# Patient Record
Sex: FEMALE | Race: WHITE | NOT HISPANIC OR LATINO | URBAN - METROPOLITAN AREA
[De-identification: names, ages, dates, MRNs, and addresses within clinical notes are randomized per-mention and may not be internally consistent; named-entity substitution may affect disease eponyms.]

---

## 2017-03-29 ENCOUNTER — AMBULATORY SURGICAL CENTER (OUTPATIENT)
Dept: URBAN - METROPOLITAN AREA SURGERY 6 | Facility: SURGERY | Age: 62
End: 2017-03-29

## 2017-03-29 VITALS
OXYGEN SATURATION: 95 % | WEIGHT: 182 LBS | DIASTOLIC BLOOD PRESSURE: 74 MMHG | HEART RATE: 70 BPM | HEIGHT: 66 IN | TEMPERATURE: 97.5 F | RESPIRATION RATE: 14 BRPM | SYSTOLIC BLOOD PRESSURE: 127 MMHG

## 2017-03-29 DIAGNOSIS — K64.0 FIRST DEGREE HEMORRHOIDS: ICD-10-CM

## 2017-03-29 DIAGNOSIS — K57.30 DVRTCLOS OF LG INT W/O PERFORATION OR ABSCESS W/O BLEEDING: ICD-10-CM

## 2017-03-29 PROBLEM — Z12.11 SCREENING FOR COLONIC NEOPLASIA: Status: ACTIVE | Noted: 2017-03-29

## 2017-03-29 PROCEDURE — 45378 DIAGNOSTIC COLONOSCOPY: CPT | Performed by: INTERNAL MEDICINE

## 2019-02-22 ENCOUNTER — HOSPITAL ENCOUNTER (EMERGENCY)
Dept: HOSPITAL 54 - ER | Age: 64
Discharge: HOME | End: 2019-02-22
Payer: COMMERCIAL

## 2019-02-22 VITALS — WEIGHT: 187 LBS | BODY MASS INDEX: 31.16 KG/M2 | HEIGHT: 65 IN

## 2019-02-22 VITALS — SYSTOLIC BLOOD PRESSURE: 137 MMHG | DIASTOLIC BLOOD PRESSURE: 74 MMHG

## 2019-02-22 DIAGNOSIS — S06.0X0A: Primary | ICD-10-CM

## 2019-02-22 DIAGNOSIS — E03.9: ICD-10-CM
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
## 2019-02-22 DIAGNOSIS — I10: ICD-10-CM

## 2019-02-22 DIAGNOSIS — R51: ICD-10-CM
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
## 2019-02-22 DIAGNOSIS — S01.01XA: ICD-10-CM
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
## 2019-02-22 PROCEDURE — A6403 STERILE GAUZE>16 <= 48 SQ IN: HCPCS

## 2019-02-22 NOTE — NUR
PT BROUGHT IN BY [employer] FOR GROUND LEVEL FALL WHILE SHOPPING AT [email or web address]

 C/O POSTERIOR HEAD LAC AND R SHOULDER PAIN

## 2024-11-01 ENCOUNTER — OFFICE (OUTPATIENT)
Dept: URBAN - METROPOLITAN AREA CLINIC 13 | Facility: CLINIC | Age: 69
End: 2024-11-01

## 2024-11-01 VITALS
TEMPERATURE: 97.4 F | HEIGHT: 66 IN | SYSTOLIC BLOOD PRESSURE: 118 MMHG | WEIGHT: 185 LBS | HEART RATE: 79 BPM | DIASTOLIC BLOOD PRESSURE: 78 MMHG

## 2024-11-01 DIAGNOSIS — R10.30 ABDOMINAL PAIN, OTHER OR MULTIPLE SITES: ICD-10-CM

## 2024-11-01 DIAGNOSIS — K57.30 DVRTCLOS OF LG INT W/O PERFORATION OR ABSCESS W/O BLEEDING: ICD-10-CM

## 2024-11-01 PROCEDURE — 99203 OFFICE O/P NEW LOW 30 MIN: CPT | Performed by: INTERNAL MEDICINE

## 2024-11-01 NOTE — SERVICEHPINOTES
Patient is a female presenting for initial evaluation of recurrent right upper quadrant abdominal pain persisting for approximately 1.5 years. The pain is described as intermittent, with episodes occurring daily or every other day, and lasting 3-4 hours. The intensity of the pain varies, sometimes being mild and other times severe, described as feeling like a "knife" and necessitating the use of Tylenol for relief. The pain is sometimes accompanied by excessive gas, particularly after consuming certain foods such as onions and radishes. She denies any issues with constipation, diarrhea, hematochezia, or heartburn. She has a history of acid reflux, for which she was treated with a one-month course of medication and dietary restrictions about 20 months ago. She is currently taking Crestor, diclofenac, levothyroxine, and olmesartan. She also reports a history of low back pain and hip pain, for which she was told she has arthritis. Past Diagnostic Results:br- Abdominal Ultrasound: No significant findings.br- CT Scan (September 3, 2024): Revealed hepatic steatosis, hepatomegaly, diverticulosis, uterine fibroids, and a right epicardial cyst.